# Patient Record
Sex: MALE | Race: WHITE
[De-identification: names, ages, dates, MRNs, and addresses within clinical notes are randomized per-mention and may not be internally consistent; named-entity substitution may affect disease eponyms.]

---

## 2018-05-21 ENCOUNTER — HOSPITAL ENCOUNTER (OUTPATIENT)
Dept: HOSPITAL 62 - END | Age: 58
Discharge: HOME | End: 2018-05-21
Attending: INTERNAL MEDICINE
Payer: COMMERCIAL

## 2018-05-21 VITALS — SYSTOLIC BLOOD PRESSURE: 151 MMHG | DIASTOLIC BLOOD PRESSURE: 93 MMHG

## 2018-05-21 DIAGNOSIS — E78.5: ICD-10-CM

## 2018-05-21 DIAGNOSIS — E78.00: ICD-10-CM

## 2018-05-21 DIAGNOSIS — Z86.010: ICD-10-CM

## 2018-05-21 DIAGNOSIS — K52.9: ICD-10-CM

## 2018-05-21 DIAGNOSIS — K64.8: ICD-10-CM

## 2018-05-21 DIAGNOSIS — D12.6: ICD-10-CM

## 2018-05-21 DIAGNOSIS — K29.80: ICD-10-CM

## 2018-05-21 DIAGNOSIS — Z80.0: ICD-10-CM

## 2018-05-21 DIAGNOSIS — Z12.11: Primary | ICD-10-CM

## 2018-05-21 DIAGNOSIS — I10: ICD-10-CM

## 2018-05-21 DIAGNOSIS — K21.9: ICD-10-CM

## 2018-05-21 DIAGNOSIS — K20.9: ICD-10-CM

## 2018-05-21 DIAGNOSIS — K29.70: ICD-10-CM

## 2018-05-21 PROCEDURE — 88342 IMHCHEM/IMCYTCHM 1ST ANTB: CPT

## 2018-05-21 PROCEDURE — 88305 TISSUE EXAM BY PATHOLOGIST: CPT

## 2018-05-21 PROCEDURE — 43239 EGD BIOPSY SINGLE/MULTIPLE: CPT

## 2018-05-21 PROCEDURE — 45380 COLONOSCOPY AND BIOPSY: CPT

## 2018-05-21 NOTE — OPERATIVE REPORT
Operative Report


DATE OF SURGERY: 05/21/18


Operative Report: 





The risks, benefits and alternatives of the procedure including risks of 

bleeding, perforation requiring surgery are  explained to the patient in detail 

and informed consent was obtained.  The patient was taken back to the endoscopy 

suite and placed in the left, lateral decubital position.  Timeout was called.  

Propofol medications administered.  A rectal examination is done which did not 

reveal any masses, tears or fissures.  An Olympus endoscope is inserted into 

the patient's rectum.  The scope was then carefully advanced all the way to the 

cecum.  The cecum was identified by the usual anatomical landmarks including 

the ileocecal valve as well as the appendiceal office.  Photodocumentation was 

obtained.  The scope was then sequentially pulled back via the various segments 

of the colon including the ascending colon, hepatic flexure, transverse colon, 

splenic flexure, descending colon and finally into the rectosigmoid portions of 

the colon.  Retroflexion maneuver was performed.


The risks benefits and alternatives of the procedure explained to the patient 

in detail and informed consent is obtained.A GIF Olympus video scope was 

inserted into the patient's mouth and hypopharynx, the esophagus is identified 

intubated and insufflated, the scope was then advanced through the esophagus 

stomach and duodenum, retroflexion maneuver is done ,the esophagus stomach and 

first and second portions of the duodenum examined


PREOPERATIVE DIAGNOSIS: Personal history of polyps.  Dysphagia


POSTOPERATIVE DIAGNOSIS: A dilated esophagus with some residual material at the 

distal esophagus.  Slight resistance with passage of the scope through the EG 

junction.  Possible Schatzki's ring status post biopsy.  Gastritis, duodenitis 

status post biopsy.  Diverticulosis.  Internal hemorrhoids.  Colon polyp that 

was removed via biopsy forceps.  Right-sided colon inflammation status post 

biopsy


OPERATION: Colonoscopy with biopsy.  EGD with biopsy


SURGEON: NIURKA ESPINOSA


ANESTHESIA: LMAC


TISSUE REMOVED OR ALTERED: As noted above.


COMPLICATIONS: 





None.


ESTIMATED BLOOD LOSS: None.


INTRAOPERATIVE FINDINGS: As noted above.  Patient may have achalasia.  May need 

manometry study.


PROCEDURE: 





Patient tolerated the procedure well.


No immediate postprocedure complications are noted.


Patient discharged in good condition.


Discharge date 5/21/2018.


Discharge diet: Regular.


Discharge activity: Regular.


2-3 week follow-up to discuss findings.


We will await pathology.


As noted above with further studies and other recommendations.


Patient is instructed to call the office or proceed to the emergency room 

should have any further problems or questions.